# Patient Record
Sex: MALE | Race: BLACK OR AFRICAN AMERICAN | NOT HISPANIC OR LATINO | URBAN - METROPOLITAN AREA
[De-identification: names, ages, dates, MRNs, and addresses within clinical notes are randomized per-mention and may not be internally consistent; named-entity substitution may affect disease eponyms.]

---

## 2018-07-01 ENCOUNTER — OUTPATIENT (OUTPATIENT)
Dept: OUTPATIENT SERVICES | Facility: HOSPITAL | Age: 63
LOS: 1 days | End: 2018-07-01
Payer: MEDICAID

## 2018-07-03 DIAGNOSIS — Z71.89 OTHER SPECIFIED COUNSELING: ICD-10-CM

## 2018-07-31 ENCOUNTER — OUTPATIENT (OUTPATIENT)
Dept: OUTPATIENT SERVICES | Facility: HOSPITAL | Age: 63
LOS: 1 days | Discharge: STILL ADMITTED OR ADMINISTRATIVE | End: 2018-07-31

## 2018-08-01 DIAGNOSIS — E78.5 HYPERLIPIDEMIA, UNSPECIFIED: ICD-10-CM

## 2018-08-01 DIAGNOSIS — K21.9 GASTRO-ESOPHAGEAL REFLUX DISEASE WITHOUT ESOPHAGITIS: ICD-10-CM

## 2018-08-01 DIAGNOSIS — F25.9 SCHIZOAFFECTIVE DISORDER, UNSPECIFIED: ICD-10-CM

## 2018-08-01 DIAGNOSIS — I10 ESSENTIAL (PRIMARY) HYPERTENSION: ICD-10-CM

## 2019-01-04 ENCOUNTER — EMERGENCY (EMERGENCY)
Facility: HOSPITAL | Age: 64
LOS: 1 days | Discharge: HOME | End: 2019-01-04
Attending: EMERGENCY MEDICINE | Admitting: EMERGENCY MEDICINE
Payer: MEDICAID

## 2019-01-04 VITALS
RESPIRATION RATE: 18 BRPM | SYSTOLIC BLOOD PRESSURE: 147 MMHG | OXYGEN SATURATION: 100 % | HEART RATE: 84 BPM | TEMPERATURE: 98 F | DIASTOLIC BLOOD PRESSURE: 97 MMHG

## 2019-01-04 VITALS
OXYGEN SATURATION: 100 % | SYSTOLIC BLOOD PRESSURE: 135 MMHG | HEART RATE: 18 BPM | RESPIRATION RATE: 18 BRPM | DIASTOLIC BLOOD PRESSURE: 81 MMHG | TEMPERATURE: 98 F

## 2019-01-04 LAB
ALBUMIN SERPL ELPH-MCNC: 4.3 G/DL — SIGNIFICANT CHANGE UP (ref 3.3–5)
ALP SERPL-CCNC: 42 U/L — SIGNIFICANT CHANGE UP (ref 40–120)
ALT FLD-CCNC: 14 U/L — SIGNIFICANT CHANGE UP (ref 4–41)
AST SERPL-CCNC: 26 U/L — SIGNIFICANT CHANGE UP (ref 4–40)
BILIRUB SERPL-MCNC: 0.5 MG/DL — SIGNIFICANT CHANGE UP (ref 0.2–1.2)
BUN SERPL-MCNC: 8 MG/DL — SIGNIFICANT CHANGE UP (ref 7–23)
CALCIUM SERPL-MCNC: 9.7 MG/DL — SIGNIFICANT CHANGE UP (ref 8.4–10.5)
CHLORIDE SERPL-SCNC: 100 MMOL/L — SIGNIFICANT CHANGE UP (ref 98–107)
CO2 SERPL-SCNC: 26 MMOL/L — SIGNIFICANT CHANGE UP (ref 22–31)
CREAT SERPL-MCNC: 0.74 MG/DL — SIGNIFICANT CHANGE UP (ref 0.5–1.3)
GLUCOSE SERPL-MCNC: 84 MG/DL — SIGNIFICANT CHANGE UP (ref 70–99)
HCT VFR BLD CALC: 48.9 % — SIGNIFICANT CHANGE UP (ref 39–50)
HGB BLD-MCNC: 15.9 G/DL — SIGNIFICANT CHANGE UP (ref 13–17)
MCHC RBC-ENTMCNC: 28.8 PG — SIGNIFICANT CHANGE UP (ref 27–34)
MCHC RBC-ENTMCNC: 32.5 % — SIGNIFICANT CHANGE UP (ref 32–36)
MCV RBC AUTO: 88.4 FL — SIGNIFICANT CHANGE UP (ref 80–100)
NRBC # FLD: 0 — SIGNIFICANT CHANGE UP
PLATELET # BLD AUTO: 252 K/UL — SIGNIFICANT CHANGE UP (ref 150–400)
PMV BLD: 10.6 FL — SIGNIFICANT CHANGE UP (ref 7–13)
POTASSIUM SERPL-MCNC: 4.5 MMOL/L — SIGNIFICANT CHANGE UP (ref 3.5–5.3)
POTASSIUM SERPL-SCNC: 4.5 MMOL/L — SIGNIFICANT CHANGE UP (ref 3.5–5.3)
PROT SERPL-MCNC: 7.7 G/DL — SIGNIFICANT CHANGE UP (ref 6–8.3)
RBC # BLD: 5.53 M/UL — SIGNIFICANT CHANGE UP (ref 4.2–5.8)
RBC # FLD: 15.1 % — HIGH (ref 10.3–14.5)
SODIUM SERPL-SCNC: 136 MMOL/L — SIGNIFICANT CHANGE UP (ref 135–145)
WBC # BLD: 4.14 K/UL — SIGNIFICANT CHANGE UP (ref 3.8–10.5)
WBC # FLD AUTO: 4.14 K/UL — SIGNIFICANT CHANGE UP (ref 3.8–10.5)

## 2019-01-04 PROCEDURE — 99284 EMERGENCY DEPT VISIT MOD MDM: CPT

## 2019-01-04 PROCEDURE — 71046 X-RAY EXAM CHEST 2 VIEWS: CPT | Mod: 26

## 2019-01-04 PROCEDURE — 72131 CT LUMBAR SPINE W/O DYE: CPT | Mod: 26

## 2019-01-04 RX ORDER — KETOROLAC TROMETHAMINE 30 MG/ML
30 SYRINGE (ML) INJECTION ONCE
Qty: 0 | Refills: 0 | Status: DISCONTINUED | OUTPATIENT
Start: 2019-01-04 | End: 2019-01-04

## 2019-01-04 RX ORDER — ACETAMINOPHEN 500 MG
650 TABLET ORAL ONCE
Qty: 0 | Refills: 0 | Status: COMPLETED | OUTPATIENT
Start: 2019-01-04 | End: 2019-01-04

## 2019-01-04 RX ORDER — LIDOCAINE 4 G/100G
1 CREAM TOPICAL ONCE
Qty: 0 | Refills: 0 | Status: COMPLETED | OUTPATIENT
Start: 2019-01-04 | End: 2019-01-04

## 2019-01-04 RX ADMIN — Medication 650 MILLIGRAM(S): at 11:45

## 2019-01-04 RX ADMIN — LIDOCAINE 1 PATCH: 4 CREAM TOPICAL at 16:03

## 2019-01-04 RX ADMIN — Medication 30 MILLIGRAM(S): at 16:03

## 2019-01-04 RX ADMIN — Medication 650 MILLIGRAM(S): at 16:04

## 2019-01-04 NOTE — ED PROVIDER NOTE - CARE PLAN
Principal Discharge DX:	Sciatica of right side  Assessment and plan of treatment:	Patient advised to follow up with  PRIMARY CARE DOCTOR 1-2 DAYS AND SPINE SPECIALIST WITHIN WEEK and told to return to the emergency department immediately for any new or concerning symptoms such as numbness or tingling, weakness, falls, urinary or bowel changes OR ANY OTHER COMPLAINTS. Patient agrees with plan.    Take motrin or tylenol as needed for pain  -rest, avoid strenuous activity  -Use over the counter lidoderm patch for pain

## 2019-01-04 NOTE — ED PROVIDER NOTE - PMH
Chronic paranoid schizophrenia    GERD (gastroesophageal reflux disease)    GERD (gastroesophageal reflux disease)    HLD (hyperlipidemia)    HTN (hypertension)    Hypertension    Paranoid schizophrenia    Schizoaffective disorder

## 2019-01-04 NOTE — ED PROVIDER NOTE - MEDICAL DECISION MAKING DETAILS
Pt with sciatica pain but with hx of weight loss and smoking concern for more complicated back pain. Will CT spine, CXR, labs, and reassess

## 2019-01-04 NOTE — ED PROVIDER NOTE - NSFOLLOWUPINSTRUCTIONS_ED_ALL_ED_FT
Patient advised to follow up with  PRIMARY CARE DOCTOR 1-2 DAYS AND SPINE SPECIALIST WITHIN WEEK and told to return to the emergency department immediately for any new or concerning symptoms such as numbness or tingling, weakness, falls, urinary or bowel changes OR ANY OTHER COMPLAINTS. Patient agrees with plan.    Take motrin or tylenol as needed for pain  -rest, avoid strenuous activity  -Use over the counter lidoderm patch for pain     Advance activity as tolerated.  Continue all previously prescribed medications as directed unless otherwise instructed.  Follow up with your primary care physician in 48-72 hours- bring copies of your results.  Return to the ER for worsening or persistent symptoms, and/or ANY NEW OR CONCERNING SYMPTOMS. If you have issues obtaining follow up, please call: [phone] to obtain a doctor or specialist who takes your insurance in your area.  You may call [phone] to make an appointment with the internal medicine clinic.

## 2019-01-04 NOTE — ED PROVIDER NOTE - PLAN OF CARE
Patient advised to follow up with  PRIMARY CARE DOCTOR 1-2 DAYS AND SPINE SPECIALIST WITHIN WEEK and told to return to the emergency department immediately for any new or concerning symptoms such as numbness or tingling, weakness, falls, urinary or bowel changes OR ANY OTHER COMPLAINTS. Patient agrees with plan.    Take motrin or tylenol as needed for pain  -rest, avoid strenuous activity  -Use over the counter lidoderm patch for pain

## 2019-01-04 NOTE — ED PROVIDER NOTE - PROGRESS NOTE DETAILS
HEATHER Camacho: Patient seen and worked up by Dr. Bloch, pending CT lumbar spine for c/o low back pain radiating to Rt leg. CT lumbar +disc herniations, no acute or emergent findings. Pt remains neuro intact, ambulatory in ED. Pt stable for d/c home with spine follow up outpatient. Pt advised to take motrin or tylenol as needed for pain and use over the counter lidoderm patches. Pt understood and agreed with plan. All question and concerns addressed. Strict return instructions given.

## 2019-01-04 NOTE — ED ADULT NURSE NOTE - NSIMPLEMENTINTERV_GEN_ALL_ED
Implemented All Universal Safety Interventions:  [place] to call system. Call bell, personal items and telephone within reach. Instruct patient to call for assistance. Room bathroom lighting operational. Non-slip footwear when patient is off stretcher. Physically safe environment: no spills, clutter or unnecessary equipment. Stretcher in lowest position, wheels locked, appropriate side rails in place.

## 2019-01-04 NOTE — ED PROVIDER NOTE - MUSCULOSKELETAL MINIMAL EXAM
Positive straight leg raise. No muscle or point tenderness on leg. Nml strength. Nml sensation. Pulses itnact. No edema. Small callous on left foot head of the 5th metatarsal and hammertoe on left foot

## 2019-01-04 NOTE — ED PROVIDER NOTE - OBJECTIVE STATEMENT
63y M with psychiatric hx on multiple medications presents with 7 month of right back pain radiating down buttock, leg, and heel. Also describes chronic cough and weight loss of 25 pounds. Pt is smoker. No drugs. Has hx of alcohol use none in the last 15 years. Denies CP, SOB, nausea, vomiting, or diarrhea. Had nausea, vomiting, and diarrhea illness 6 months ago now resolved. Denies hx of heart disease, DM, or operation. No injury to back

## 2019-05-10 NOTE — ED PROVIDER NOTE - RESPIRATORY DISTRESS
Discharge instructions given to pt, pt verbalized understanding. All questions answered. Follow-up instructions given.      Chago Sim RN  05/10/19 4288 Rhonchi on left side cleared with coughing

## 2019-12-01 ENCOUNTER — OUTPATIENT (OUTPATIENT)
Dept: OUTPATIENT SERVICES | Facility: HOSPITAL | Age: 64
LOS: 1 days | End: 2019-12-01
Payer: MEDICAID

## 2019-12-01 PROCEDURE — G9005: CPT

## 2020-01-01 ENCOUNTER — OUTPATIENT (OUTPATIENT)
Dept: OUTPATIENT SERVICES | Facility: HOSPITAL | Age: 65
LOS: 1 days | End: 2020-01-01
Payer: MEDICAID

## 2020-01-28 DIAGNOSIS — Z71.89 OTHER SPECIFIED COUNSELING: ICD-10-CM

## 2020-05-27 ENCOUNTER — EMERGENCY (EMERGENCY)
Facility: HOSPITAL | Age: 65
LOS: 1 days | Discharge: LEFT BEFORE DISCHARGE | End: 2020-05-27
Attending: EMERGENCY MEDICINE | Admitting: EMERGENCY MEDICINE
Payer: MEDICAID

## 2020-05-27 VITALS
SYSTOLIC BLOOD PRESSURE: 176 MMHG | OXYGEN SATURATION: 100 % | DIASTOLIC BLOOD PRESSURE: 80 MMHG | RESPIRATION RATE: 16 BRPM | TEMPERATURE: 97 F | HEART RATE: 72 BPM

## 2020-05-27 VITALS
TEMPERATURE: 99 F | DIASTOLIC BLOOD PRESSURE: 92 MMHG | OXYGEN SATURATION: 96 % | RESPIRATION RATE: 16 BRPM | SYSTOLIC BLOOD PRESSURE: 136 MMHG | HEART RATE: 82 BPM

## 2020-05-27 LAB
ALBUMIN SERPL ELPH-MCNC: 4.7 G/DL — SIGNIFICANT CHANGE UP (ref 3.3–5)
ALP SERPL-CCNC: 53 U/L — SIGNIFICANT CHANGE UP (ref 40–120)
ALT FLD-CCNC: 13 U/L — SIGNIFICANT CHANGE UP (ref 4–41)
ANION GAP SERPL CALC-SCNC: 13 MMO/L — SIGNIFICANT CHANGE UP (ref 7–14)
APPEARANCE UR: CLEAR — SIGNIFICANT CHANGE UP
APTT BLD: 33.7 SEC — SIGNIFICANT CHANGE UP (ref 27.5–36.3)
AST SERPL-CCNC: 30 U/L — SIGNIFICANT CHANGE UP (ref 4–40)
BASOPHILS # BLD AUTO: 0.03 K/UL — SIGNIFICANT CHANGE UP (ref 0–0.2)
BASOPHILS NFR BLD AUTO: 0.4 % — SIGNIFICANT CHANGE UP (ref 0–2)
BILIRUB SERPL-MCNC: 0.7 MG/DL — SIGNIFICANT CHANGE UP (ref 0.2–1.2)
BILIRUB UR-MCNC: NEGATIVE — SIGNIFICANT CHANGE UP
BLOOD UR QL VISUAL: NEGATIVE — SIGNIFICANT CHANGE UP
BUN SERPL-MCNC: 8 MG/DL — SIGNIFICANT CHANGE UP (ref 7–23)
CALCIUM SERPL-MCNC: 10.1 MG/DL — SIGNIFICANT CHANGE UP (ref 8.4–10.5)
CHLORIDE SERPL-SCNC: 99 MMOL/L — SIGNIFICANT CHANGE UP (ref 98–107)
CO2 SERPL-SCNC: 26 MMOL/L — SIGNIFICANT CHANGE UP (ref 22–31)
COLOR SPEC: YELLOW — SIGNIFICANT CHANGE UP
CREAT SERPL-MCNC: 0.73 MG/DL — SIGNIFICANT CHANGE UP (ref 0.5–1.3)
EOSINOPHIL # BLD AUTO: 0.03 K/UL — SIGNIFICANT CHANGE UP (ref 0–0.5)
EOSINOPHIL NFR BLD AUTO: 0.4 % — SIGNIFICANT CHANGE UP (ref 0–6)
ETHANOL BLD-MCNC: < 10 MG/DL — SIGNIFICANT CHANGE UP
GLUCOSE SERPL-MCNC: 97 MG/DL — SIGNIFICANT CHANGE UP (ref 70–99)
GLUCOSE UR-MCNC: NEGATIVE — SIGNIFICANT CHANGE UP
HCT VFR BLD CALC: 49.5 % — SIGNIFICANT CHANGE UP (ref 39–50)
HGB BLD-MCNC: 16.1 G/DL — SIGNIFICANT CHANGE UP (ref 13–17)
IMM GRANULOCYTES NFR BLD AUTO: 0.1 % — SIGNIFICANT CHANGE UP (ref 0–1.5)
INR BLD: 0.98 — SIGNIFICANT CHANGE UP (ref 0.88–1.17)
KETONES UR-MCNC: NEGATIVE — SIGNIFICANT CHANGE UP
LEUKOCYTE ESTERASE UR-ACNC: NEGATIVE — SIGNIFICANT CHANGE UP
LIDOCAIN IGE QN: 22.9 U/L — SIGNIFICANT CHANGE UP (ref 7–60)
LYMPHOCYTES # BLD AUTO: 1.38 K/UL — SIGNIFICANT CHANGE UP (ref 1–3.3)
LYMPHOCYTES # BLD AUTO: 20.1 % — SIGNIFICANT CHANGE UP (ref 13–44)
MCHC RBC-ENTMCNC: 28 PG — SIGNIFICANT CHANGE UP (ref 27–34)
MCHC RBC-ENTMCNC: 32.5 % — SIGNIFICANT CHANGE UP (ref 32–36)
MCV RBC AUTO: 86.1 FL — SIGNIFICANT CHANGE UP (ref 80–100)
MONOCYTES # BLD AUTO: 0.61 K/UL — SIGNIFICANT CHANGE UP (ref 0–0.9)
MONOCYTES NFR BLD AUTO: 8.9 % — SIGNIFICANT CHANGE UP (ref 2–14)
NEUTROPHILS # BLD AUTO: 4.79 K/UL — SIGNIFICANT CHANGE UP (ref 1.8–7.4)
NEUTROPHILS NFR BLD AUTO: 70.1 % — SIGNIFICANT CHANGE UP (ref 43–77)
NITRITE UR-MCNC: NEGATIVE — SIGNIFICANT CHANGE UP
NRBC # FLD: 0 K/UL — SIGNIFICANT CHANGE UP (ref 0–0)
PH UR: 6.5 — SIGNIFICANT CHANGE UP (ref 5–8)
PLATELET # BLD AUTO: 224 K/UL — SIGNIFICANT CHANGE UP (ref 150–400)
PMV BLD: 10.1 FL — SIGNIFICANT CHANGE UP (ref 7–13)
POTASSIUM SERPL-MCNC: 4.5 MMOL/L — SIGNIFICANT CHANGE UP (ref 3.5–5.3)
POTASSIUM SERPL-SCNC: 4.5 MMOL/L — SIGNIFICANT CHANGE UP (ref 3.5–5.3)
PROT SERPL-MCNC: 8.1 G/DL — SIGNIFICANT CHANGE UP (ref 6–8.3)
PROT UR-MCNC: NEGATIVE — SIGNIFICANT CHANGE UP
PROTHROM AB SERPL-ACNC: 11.3 SEC — SIGNIFICANT CHANGE UP (ref 9.8–13.1)
RBC # BLD: 5.75 M/UL — SIGNIFICANT CHANGE UP (ref 4.2–5.8)
RBC # FLD: 15 % — HIGH (ref 10.3–14.5)
SODIUM SERPL-SCNC: 138 MMOL/L — SIGNIFICANT CHANGE UP (ref 135–145)
SP GR SPEC: 1.02 — SIGNIFICANT CHANGE UP (ref 1–1.04)
UROBILINOGEN FLD QL: SIGNIFICANT CHANGE UP
WBC # BLD: 6.85 K/UL — SIGNIFICANT CHANGE UP (ref 3.8–10.5)
WBC # FLD AUTO: 6.85 K/UL — SIGNIFICANT CHANGE UP (ref 3.8–10.5)

## 2020-05-27 PROCEDURE — 99285 EMERGENCY DEPT VISIT HI MDM: CPT

## 2020-05-27 PROCEDURE — 73552 X-RAY EXAM OF FEMUR 2/>: CPT | Mod: 26,LT

## 2020-05-27 PROCEDURE — 72125 CT NECK SPINE W/O DYE: CPT | Mod: 26

## 2020-05-27 PROCEDURE — 71045 X-RAY EXAM CHEST 1 VIEW: CPT | Mod: 26

## 2020-05-27 PROCEDURE — 71101 X-RAY EXAM UNILAT RIBS/CHEST: CPT | Mod: 26,LT

## 2020-05-27 PROCEDURE — 99284 EMERGENCY DEPT VISIT MOD MDM: CPT

## 2020-05-27 PROCEDURE — 73502 X-RAY EXAM HIP UNI 2-3 VIEWS: CPT | Mod: 26,LT

## 2020-05-27 PROCEDURE — 70450 CT HEAD/BRAIN W/O DYE: CPT | Mod: 26

## 2020-05-27 PROCEDURE — 74177 CT ABD & PELVIS W/CONTRAST: CPT | Mod: 26

## 2020-05-27 PROCEDURE — 71260 CT THORAX DX C+: CPT | Mod: 26

## 2020-05-27 RX ORDER — ACETAMINOPHEN 500 MG
2 TABLET ORAL
Qty: 42 | Refills: 0
Start: 2020-05-27 | End: 2020-06-02

## 2020-05-27 RX ORDER — IBUPROFEN 200 MG
1 TABLET ORAL
Qty: 15 | Refills: 0
Start: 2020-05-27 | End: 2020-05-31

## 2020-05-27 RX ORDER — ACETAMINOPHEN 500 MG
650 TABLET ORAL ONCE
Refills: 0 | Status: DISCONTINUED | OUTPATIENT
Start: 2020-05-27 | End: 2020-05-27

## 2020-05-27 RX ORDER — ACETAMINOPHEN 500 MG
975 TABLET ORAL ONCE
Refills: 0 | Status: COMPLETED | OUTPATIENT
Start: 2020-05-27 | End: 2020-05-27

## 2020-05-27 RX ORDER — IBUPROFEN 200 MG
400 TABLET ORAL ONCE
Refills: 0 | Status: COMPLETED | OUTPATIENT
Start: 2020-05-27 | End: 2020-05-27

## 2020-05-27 RX ORDER — IBUPROFEN 200 MG
600 TABLET ORAL ONCE
Refills: 0 | Status: DISCONTINUED | OUTPATIENT
Start: 2020-05-27 | End: 2020-05-27

## 2020-05-27 RX ADMIN — Medication 400 MILLIGRAM(S): at 02:48

## 2020-05-27 RX ADMIN — Medication 975 MILLIGRAM(S): at 02:48

## 2020-05-27 NOTE — ED PROVIDER NOTE - CARE PLAN
Principal Discharge DX:	Musculoskeletal pain Principal Discharge DX:	Rib fractures  Assessment and plan of treatment:	Follow up with your primary care provider within 48 hours  Follow up with an orthopedic doctor within one week  Take Motrin 600mg every 8 hours as needed for pain, take with food  Return to the emergency department with any worsening or concerning symptoms, fevers, chills, inability to bear weight or any other concerns.  Secondary Diagnosis:	Musculoskeletal pain Principal Discharge DX:	Rib fractures  Assessment and plan of treatment:	Follow up with your primary care provider within 48 hours  Follow up with an orthopedic doctor within one week  Take Motrin 600mg every 8 hours as needed for pain, take with food  Return to the emergency department with any worsening or concerning symptoms, fevers, chills, inability to bear weight or any other concerns.  Secondary Diagnosis:	Musculoskeletal pain  Secondary Diagnosis:	Fall

## 2020-05-27 NOTE — ED ADULT NURSE NOTE - INTERVENTIONS DEFINITIONS
Physically safe environment: no spills, clutter or unnecessary equipment/Stretcher in lowest position, wheels locked, appropriate side rails in place/[place] to call system/Monitor for mental status changes and reorient to person, place, and time

## 2020-05-27 NOTE — ED PROVIDER NOTE - PLAN OF CARE
Follow up with your primary care provider within 48 hours  Follow up with an orthopedic doctor within one week  Take Motrin 600mg every 8 hours as needed for pain, take with food  Return to the emergency department with any worsening or concerning symptoms, fevers, chills, inability to bear weight or any other concerns.

## 2020-05-27 NOTE — ED PROVIDER NOTE - MUSCULOSKELETAL MINIMAL EXAM
+L posterior axillary line rib tenderness around 8-10th ribs, no crepitus or ecchymosis, no flail chest or parodical movements

## 2020-05-27 NOTE — ED ADULT NURSE REASSESSMENT NOTE - NS ED NURSE REASSESS COMMENT FT1
Patient resting comfortably. 18 G IV placed to L AC, labs drawn and sent. VS as noted. Awaiting further orders, will continue to monitor.

## 2020-05-27 NOTE — ED ADULT TRIAGE NOTE - CHIEF COMPLAINT QUOTE
Patient from [facility] 75 c/o lower back pain, Left side ribs, L ankle s/p mechanical fall today. Patient states he lost his balance and hit his head. Hx. Schizoaffective, HTN.

## 2020-05-27 NOTE — ED PROVIDER NOTE - ATTENDING CONTRIBUTION TO CARE
HPI: 65 yo M with Past Medical History of schizophrenia, HTN, HLD, GERD that lives at [facility] presents to the ED c/o body pain s/p mechanical fall today. Pt states while walking on sidewalk yesterday afternoon he fell backward on uneven pavement, hitting back then striking head, there was no LOC, pt is not on AC. Pt states he was able to stand on his own power, walked to the store then back to his apartment. Pt reports L sided ribs pain, neck soreness and that is why he came in for eval, did not take any meds since fall for pain. Denies CP, SOB, cough, HA, dizziness, numbness or paresthesias.   EXAM: NAD, thin, head atraumatic, eyes EOMI/PERRL, C/T/L spine without palpable stepoffs or tenderness to palpation, chest wall and clavicles are stable, abd soft nontender, skin without signs of trauma, no ecchymosis, left flank at lower ribs lateral with tenderness to palpation without open wounds, no crepitus no stepoffs, pelvis stable, moving all 4 ext spontaneously with normal ROM.   MDM: [facility] multiple medical problems [facility] back of head on ground from standing 24 hours prior to arrival that presents with left flank pain and left neck pain. No midline bony tenderness to palpation on neck or spine but found to have left flank/rib pain without obvious gross deformities or signs trauma. Will need imaging and provide pain meds and reassess.

## 2020-05-27 NOTE — ED PROVIDER NOTE - NSFOLLOWUPCLINICS_GEN_ALL_ED_FT
[facility] Orthopedic Surgery  Orthopedic Surgery  [address], 3rd & 4th [place] [address]  Phone: [phone]  Fax:   Follow Up Time:

## 2020-05-27 NOTE — PROVIDER CONTACT NOTE (OTHER) - BACKGROUND
KIA notified to arrange transportation for pt being discharged. KIA spoke with  of KIA, Radha, to confirm Group Home Huddle Form is currently waived.
As per provider, pt is ready for d/c.
no

## 2020-05-27 NOTE — ED ADULT NURSE REASSESSMENT NOTE - NS ED NURSE REASSESS COMMENT FT1
Patient resting comfortably. Reports relief of pain after medication. Per SW, to take cab home for safe discharge. Patient resting comfortably. Reports relief of pain after medication. SW contacted Enio for safe discharge. Pt. awaiting cab service for . Wheeled to exit by PCA.

## 2020-05-27 NOTE — ED PROVIDER NOTE - OBJECTIVE STATEMENT
63yo M w/ pmhx of schizophrenia, HTN, HLD, GERD presents to the ED c/o body pain s/p mechanical fall today. Pt states while walking on sidewalk yesterday afternoon he fell backward on uneven pavement, hitting back then striking head, there was no LOC, pt is not on AC. Pt states he was able to stand on his own power, walked to the store then back to his apartment. Pt reports L sided ribs pain, neck soreness. Denies CP, SOB, cough, HA, dizziness, numbness or paresthesias.

## 2020-05-27 NOTE — ED PROVIDER NOTE - PATIENT PORTAL LINK FT
You can access the FollowMyHealth Patient Portal offered by [facility] by registering at the following website: [email or web address]. By joining [employer]’s FollowMyHealth portal, you will also be able to view your health information using other applications (apps) compatible with our system. You can access the FollowMyHealth Patient Portal offered by [facility] by registering at the following website: [email or web address]. By joining [employer]’s FollowMyHealth portal, you will also be able to view your health information using other applications (apps) compatible with our system.

## 2020-05-27 NOTE — ED PROVIDER NOTE - NSFOLLOWUPINSTRUCTIONS_ED_ALL_ED_FT
Follow up with your primary care provider within 48 hours  Follow up with an orthopedic doctor within one week  Take Motrin 600mg every 8 hours as needed for pain, take with food  Return to the emergency department with any worsening or concerning symptoms, fevers, chills, inability to bear weight or any other concerns.     Use incentive spirometry 10x per hour for the next 2 weeks. You have an abnormal CT scan and need to have another performed for further evaluation to do so. You should return to the ER to have this performed. Use the spirometer given to you in the ER, this can prevent pneumonia. Continue all previously prescribed medications as directed.  Follow up with your primary care physician in 48-72 hours- bring copies of your results.  Return to the ER for worsening or persistent symptoms, and/or ANY NEW OR CONCERNING SYMPTOMS. If you have issues obtaining follow up, please call: [phone] to obtain a doctor or specialist who takes your insurance in your area.  You may call [phone] to make an appointment with the internal medicine clinic.

## 2020-05-27 NOTE — PROVIDER CONTACT NOTE (OTHER) - ASSESSMENT
As per HEATHER Pillai ([id number]), pt is signing out AMA and is able to return to their residence, [employer] [facility]. KIA spoke with Rosa Maria at [facility] ([address] [place] [address] P: [phone]) and confirmed pt's mode of travel is independent via public transportation. KIA spoke with HEATHER Pillai and agreed to send pt via cab. KIA attempted to arrange cab via [employer] online, however pt does not have transportation benefits. KIA arranged cab via [employer], Booking ID [id number], $8.00 trip, confirmed with  of KIA, Radha BHAT Verbal group home huddle form completed with HEATHER Pillai. As per HEATHER Pillai ([id number]), pt is signing out AMA and is able to return to their residence, [employer] [facility]. KIA spoke with Rosa Maria at [facility] ([address] [place] [address] P: [phone]) and confirmed pt's mode of travel is independent via public transportation. KIA spoke with HEATHER Pillai and agreed to send pt via cab. KIA attempted to arrange cab via [employer] online, however pt does not have transportation benefits. KIA arranged cab via [employer], Booking ID [id number], $8.80 trip, confirmed with  of KIA, Radha BHAT Verbal group home huddle form completed with HEATHER Pillai.

## 2020-05-27 NOTE — CONSULT NOTE ADULT - ASSESSMENT
64y male s/p fall with possible intracranial hemorrhage on CT however there was motion artifact     PLAN:   - one shot mri as patient does not want further CT

## 2020-05-27 NOTE — PROVIDER CONTACT NOTE (OTHER) - ASSESSMENT
KIA contacted [facility] [address] ([phone]) to confirm pt mode of transportation. Rosanna ,  informed SW that pt can take a taxi back. KIA contacted [facility] and spoke to eG ([phone]) to book taxi but due to excessive wait time and ineligibility for taxi services, [employer] was booked with confirmation number [id number]. Verbal huddle completed. KIA contacted [facility] [address] ([phone]) to confirm pt mode of transportation. Rosanna ,  informed KIA that pt can take a taxi back. KIA contacted [facility] and spoke to Ge ([phone]) to book taxi but due to excessive wait time and ineligibility for taxi services, [employer] was booked with confirmation number [id number]. Verbal huddle completed.    D/c cancelled due to pt complaining of pain.

## 2020-05-27 NOTE — ED PROVIDER NOTE - CLINICAL SUMMARY MEDICAL DECISION MAKING FREE TEXT BOX
A:  -MSK pain s/p fall  P:  -XR's, pain control A:  -MSK pain s/p fall, rib fx  P:  -XR's, pain control

## 2020-05-27 NOTE — ED ADULT NURSE REASSESSMENT NOTE - NS ED NURSE REASSESS COMMENT FT1
Patient received from night RN resting comfortably, sleeping when undisturbed.  Patient offers no complaints at this time, denies chest pain, SOB, pain to hip.  VSS, will continue to monitor.

## 2020-05-27 NOTE — CONSULT NOTE ADULT - SUBJECTIVE AND OBJECTIVE BOX
NEUROSURGERY CONSULT  ARLIN BROWN   05-27-20 @ 10:41    HPI: 64y Male schizophrenia, HTN, HLD, GERD presents to the ED c/o body pain s/p mechanical fall today. Pt states while walking on sidewalk yesterday afternoon he fell backward on uneven pavement, hitting back then striking head, there was no LOC, pt is not on AC. Pt states he was able to stand on his own power, walked to the store then back to his apartment. Pt reports L sided ribs pain, neck soreness. Denies CP, SOB, cough, HA, dizziness, numbness or paresthesias.    RADIOLOGY:     < from: CT Head No Cont (05.27.20 @ 08:54) >  IMPRESSION:  Motion limited exam.    Focal area of hyperdensity in the lateral right frontal lobe which may be secondary to motion artifact however the possibility of a small area of hemorrhage is not excluded. Short-term follow-up CT is advised.    CT CERVICAL SPINE:  TECHNIQUE:  Axial images were obtained using multislice helical technique.  Reformatted coronal and sagittal images were performed.      COMPARISON EXAMINATION:  None.    FINDINGS:    VERTEBRAL BODIES:  Normal.  ALIGNMENT:  Normal in height. No fracture. Multilevel marginal osteophyte formation.  INTERVERTEBRAL DISC SPACES: Multilevel disc bulges/ridges and facet arthrosis producing varying degrees of central spinal stenosis and foraminal stenosis  MISCELLANEOUS:  None.    IMPRESSION:  No fracture or traumatic subluxation.    Findings were discussed with physicians assistant Rodriguez on 5/27/2020 9:23 AM with read back.      PHYSICAL EXAM:  Vital Signs Last 24 Hrs  T(C): 36.1 (27 May 2020 08:19), Max: 37.4 (27 May 2020 02:07)  T(F): 97 (27 May 2020 08:19), Max: 99.3 (27 May 2020 02:07)  HR: 72 (27 May 2020 08:19) (66 - 82)  BP: 176/80 (27 May 2020 08:19) (136/92 - 176/80)  BP(mean): --  RR: 16 (27 May 2020 08:19) (16 - 16)  SpO2: 100% (27 May 2020 08:19) (96% - 100%)    AAOx3  EOMI, PERRL  BAPTISTE x4 with good strength   sensation intact    LABS:                        16.1   6.85  )-----------( 224      ( 27 May 2020 07:10 )             49.5     05-27    138  |  99  |  8   ----------------------------<  97  4.5   |  26  |  0.73    Ca    10.1      27 May 2020 07:10    TPro  8.1  /  Alb  4.7  /  TBili  0.7  /  DBili  x   /  AST  30  /  ALT  13  /  AlkPhos  53  05-27    PT/INR - ( 27 May 2020 07:10 )   PT: 11.3 SEC;   INR: 0.98          PTT - ( 27 May 2020 07:10 )  PTT:33.7 SEC

## 2020-05-27 NOTE — ED ADULT NURSE NOTE - CHIEF COMPLAINT QUOTE
Patient from [facility] 75 c/o lower back pain, Left side ribs, L ankle s/p mechanical fall today. Patient states he lost his balance and hit his head. Hx. Schizoaffective, HTN.

## 2020-05-27 NOTE — ED ADULT NURSE NOTE - OBJECTIVE STATEMENT
Patient received to room 18, A&Ox4, ambulatory, c/o fall. Pt. states he was walking to the store today and fell on uneven sidewalk. Denies hitting head/LOC. No trauma/lacerations noted. Full ROM to all extremities. Tender on palpation to L mid back. MD at bedside. Awaiting further orders, will continue to monitor.

## 2020-05-27 NOTE — ED ADULT NURSE REASSESSMENT NOTE - NS ED NURSE REASSESS COMMENT FT1
Patient c/o hip pain when getting to wheelchair for d/c. Per MD Martinez, pt. to have x-ray to assess. Will continue to monitor.

## 2020-05-27 NOTE — ED PROVIDER NOTE - PROGRESS NOTE DETAILS
Patient endorsing pain of the L hip upon discharge, social work arranged taxi for discharge, will obtain x-ray, postpone discharge, low suspicion given pt ambulatory and weight bearing w/ no deformity. Pt hip XR shows possible chip fx, will now obtain trauma survey and labs and provide pain meds and reassess. HEATHER Rodriguez: Pt has refused further interventions for his injury including transfer to [facility] (trauma Center), admission and a repeat CT scan to eval for evolution/potential bleed. Pt is not interested in those interventions and was repeatedly advised of the complications of not having further evaluation and interventions up to and including death. Pt states he is aware of those risks, that he came here for hip pain and that he wants to go home. Spoke to Neurosurgery who states that a one view MRI could be potentially performed. Pt is currently refusing that study which would be done to R/O neurosurgical emergencies such as an expanding ICH. HEATHER Rodriguez: Revived signout on pt. Hip is neg for FX on final and CT read, + Rib fractures, possible ICH. Pt has refused further interventions for his injury including transfer to [facility] (trauma Center), admission and a repeat CT scan to eval for evolution/potential bleed. Pt is not interested in those interventions and was repeatedly advised of the complications of not having further evaluation and interventions up to and including death. Pt states he is aware of those risks, that he came here for hip pain and that he wants to go home. Spoke to Neurosurgery who states that a one view MRI could be potentially performed. Pt is currently refusing that study which would be done to R/O neurosurgical emergencies such as an expanding ICH. Pt states that he wants to go home now, will AMA. Will provide spirometer for rib fractures. The pt is clinically sober, AA&Ox3, free from distracting injury.  Throughout our interactions in the ED today, the pt has demonstrated concrete thinking/reasoning, has maintained an orderly/reasonable conversation, appears to have intact insight/judgment/reason and therefore in our opinion has capacity to make decisions.  Given the pt’s presentation, we communicated our concern for Intracranial hemorrhage or other traumatic injuries in laymans terms. The pt verbalized an understanding of our worries. We’ve told the patient that the ED evaluation is incomplete & many troublesome conditions haven’t been r/o. We have discussed the need for further ED w/u so we can get more information about his abnormal CT scan and his rib fractures.  We have discussed the range of possible dx, potential testing & tx options.  We’ve made  numerous efforts to prevent the pt from leaving AMA.  Our discussions included the potential outcomes of leaving AMA, including worsening of their condition, becoming permanently disabled/in pain/critically ill, or death.  Despite these efforts, we were unable to convince the pt to stay.  The pt is refusing any  further care and is leaving against medical advice. We have attempted to offer tx/rx/guidance for any dangerous conditions which are most likely and/or dangerous.  We have answered all questions and have implored the pt to return ASAP to complete the w/u.  A staff member witnessed the patient consenting to AMA. The pt is clinically sober, AA&Ox3, free from distracting injury.  Throughout our interactions in the ED today, the pt has demonstrated concrete thinking/reasoning, has maintained an orderly/reasonable conversation, appears to have intact insight/judgment/reason and therefore in our opinion has capacity to make decisions.  Given the pt’s presentation, we communicated our concern for Intracranial hemorrhage or other traumatic injuries in laymans terms. The pt verbalized an understanding of our worries. We’ve told the patient that the ED evaluation is incomplete & many troublesome conditions haven’t been r/o. We have discussed the need for further ED w/u so we can get more information about his abnormal CT scan and his rib fractures.  We have discussed the range of possible dx, potential testing & tx options.  We’ve made  numerous efforts to prevent the pt from leaving AMA.  Our discussions included the potential outcomes of leaving AMA, including worsening of their condition, becoming permanently disabled/in pain/critically ill, or death.  Despite these efforts, we were unable to convince the pt to stay.  The pt is refusing any  further care and is leaving against medical advice. We have attempted to offer tx/rx/guidance for any dangerous conditions which are most likely and/or dangerous.  We have answered all questions and have implored the pt to return ASAP to complete the w/u.  A staff member witnessed the patient consenting to AMA. While pt carries an underlying psychiatric diagnosis pt while in the ER has demonstrated understanding of his medical conditions and insight regarding the risks of not having further investigation into his injuries. Pt currently displays capacity to AMA.

## 2020-08-01 ENCOUNTER — EMERGENCY (EMERGENCY)
Facility: HOSPITAL | Age: 65
LOS: 1 days | Discharge: PSYCHIATRIC OR SUBSTANCE USE FACILITY | End: 2020-08-01
Attending: EMERGENCY MEDICINE | Admitting: EMERGENCY MEDICINE
Payer: MEDICAID

## 2020-08-01 VITALS
RESPIRATION RATE: 16 BRPM | DIASTOLIC BLOOD PRESSURE: 85 MMHG | HEART RATE: 59 BPM | SYSTOLIC BLOOD PRESSURE: 135 MMHG | TEMPERATURE: 98 F | OXYGEN SATURATION: 100 %

## 2020-08-01 VITALS
OXYGEN SATURATION: 100 % | DIASTOLIC BLOOD PRESSURE: 84 MMHG | SYSTOLIC BLOOD PRESSURE: 132 MMHG | TEMPERATURE: 99 F | HEART RATE: 87 BPM | RESPIRATION RATE: 18 BRPM

## 2020-08-01 DIAGNOSIS — F20.0 PARANOID SCHIZOPHRENIA: ICD-10-CM

## 2020-08-01 LAB
ANION GAP SERPL CALC-SCNC: 14 MMO/L — SIGNIFICANT CHANGE UP (ref 7–14)
APAP SERPL-MCNC: < 15 UG/ML — LOW (ref 15–25)
BUN SERPL-MCNC: 14 MG/DL — SIGNIFICANT CHANGE UP (ref 7–23)
CALCIUM SERPL-MCNC: 8.8 MG/DL — SIGNIFICANT CHANGE UP (ref 8.4–10.5)
CHLORIDE SERPL-SCNC: 103 MMOL/L — SIGNIFICANT CHANGE UP (ref 98–107)
CO2 SERPL-SCNC: 23 MMOL/L — SIGNIFICANT CHANGE UP (ref 22–31)
CREAT SERPL-MCNC: 0.77 MG/DL — SIGNIFICANT CHANGE UP (ref 0.5–1.3)
ETHANOL BLD-MCNC: < 10 MG/DL — SIGNIFICANT CHANGE UP
GLUCOSE SERPL-MCNC: 120 MG/DL — HIGH (ref 70–99)
HCT VFR BLD CALC: 37.9 % — LOW (ref 39–50)
HGB BLD-MCNC: 12.4 G/DL — LOW (ref 13–17)
MCHC RBC-ENTMCNC: 28 PG — SIGNIFICANT CHANGE UP (ref 27–34)
MCHC RBC-ENTMCNC: 32.7 % — SIGNIFICANT CHANGE UP (ref 32–36)
MCV RBC AUTO: 85.6 FL — SIGNIFICANT CHANGE UP (ref 80–100)
NRBC # FLD: 0 K/UL — SIGNIFICANT CHANGE UP (ref 0–0)
PLATELET # BLD AUTO: 211 K/UL — SIGNIFICANT CHANGE UP (ref 150–400)
PMV BLD: 9.4 FL — SIGNIFICANT CHANGE UP (ref 7–13)
POTASSIUM SERPL-MCNC: 4.1 MMOL/L — SIGNIFICANT CHANGE UP (ref 3.5–5.3)
POTASSIUM SERPL-SCNC: 4.1 MMOL/L — SIGNIFICANT CHANGE UP (ref 3.5–5.3)
RBC # BLD: 4.43 M/UL — SIGNIFICANT CHANGE UP (ref 4.2–5.8)
RBC # FLD: 14.9 % — HIGH (ref 10.3–14.5)
SALICYLATES SERPL-MCNC: < 5 MG/DL — LOW (ref 15–30)
SARS-COV-2 RNA SPEC QL NAA+PROBE: SIGNIFICANT CHANGE UP
SODIUM SERPL-SCNC: 140 MMOL/L — SIGNIFICANT CHANGE UP (ref 135–145)
TSH SERPL-MCNC: 1.25 UIU/ML — SIGNIFICANT CHANGE UP (ref 0.27–4.2)
VALPROATE SERPL-MCNC: < 3.2 UG/ML — LOW (ref 50–100)
WBC # BLD: 3.86 K/UL — SIGNIFICANT CHANGE UP (ref 3.8–10.5)
WBC # FLD AUTO: 3.86 K/UL — SIGNIFICANT CHANGE UP (ref 3.8–10.5)

## 2020-08-01 PROCEDURE — 99285 EMERGENCY DEPT VISIT HI MDM: CPT

## 2020-08-01 RX ORDER — HALOPERIDOL DECANOATE 100 MG/ML
5 INJECTION INTRAMUSCULAR ONCE
Refills: 0 | Status: COMPLETED | OUTPATIENT
Start: 2020-08-01 | End: 2020-08-01

## 2020-08-01 RX ORDER — FLUPHENAZINE HYDROCHLORIDE 1 MG/1
10 TABLET, FILM COATED ORAL ONCE
Refills: 0 | Status: COMPLETED | OUTPATIENT
Start: 2020-08-01 | End: 2020-08-01

## 2020-08-01 RX ORDER — BENZTROPINE MESYLATE 1 MG
1 TABLET ORAL ONCE
Refills: 0 | Status: COMPLETED | OUTPATIENT
Start: 2020-08-01 | End: 2020-08-01

## 2020-08-01 RX ADMIN — FLUPHENAZINE HYDROCHLORIDE 10 MILLIGRAM(S): 1 TABLET, FILM COATED ORAL at 23:00

## 2020-08-01 RX ADMIN — HALOPERIDOL DECANOATE 5 MILLIGRAM(S): 100 INJECTION INTRAMUSCULAR at 15:42

## 2020-08-01 RX ADMIN — Medication 2 MILLIGRAM(S): at 15:42

## 2020-08-01 RX ADMIN — Medication 1 MILLIGRAM(S): at 23:00

## 2020-08-01 NOTE — ED BEHAVIORAL HEALTH ASSESSMENT NOTE - PSYCHIATRIC ISSUES AND PLAN (INCLUDE STANDING AND PRN MEDICATION)
Restart home meds - Abilify 30mg qHS, fluphenazine 10mg BID, Depakote 500mg BID, Cogentin 1mg BID, Senna BID, Colace BID. Consider CIFUENTES. Haldol 5mg, Ativan 2mg, Bendaryl 50mg PO/IM PRN for agitation

## 2020-08-01 NOTE — ED ADULT TRIAGE NOTE - CHIEF COMPLAINT QUOTE
Pt from bus stop and bystander called 911 for pt yelling and screaming, cursing, and jumping around.  Unknown Hx

## 2020-08-01 NOTE — ED BEHAVIORAL HEALTH ASSESSMENT NOTE - CASE SUMMARY
Pt is a 63yo man, single, non-caretaker, disabled per previous documentation, domiciled in residence of [facility]; PPH of SCZ vs SAD, numerous previous hospitalizations, no known previous SAs, SIB, no known violence or legal problems; BIB EMS found disorganized, threatening others at bus station.  Pt at this time is irritable, extremely disorganized, refusing to speak to provider and unable to provide much history.  Per collateral, pt has been med noncompliant, not cooperative at residence, verbally aggressive, pacing in hallway.  As pt is disorganized to point of being unable to care for basic needs, frankly psychotic and med noncompliant, with safety risk of yelling at others on the street, he warrants 2P involuntary psych admission.  As no inpatient male psych beds available at [facility] or [facility] will pursue transfer to [facility] once medical clearance completed.

## 2020-08-01 NOTE — ED BEHAVIORAL HEALTH NOTE - BEHAVIORAL HEALTH NOTE
KIA received handoff from KIA Huizar.  Per provider, Stalin SHERMAN, the patient has been accepted to [facility] [phone]. KIA called [place] and spoke to Arturo, representative, who requested a fax of the patients COVID results fax#[phone]. KIA faxed COVID negative results. Arturo acknowledged receipt. Arturo reported that she will obtain insurance prior authorization for both patients. Patient is assigned to [place] [address] [phone]. KIA informed providers. Nurse Pillai provided handoff to [place] and arranged transportation.  KIA role is complete.

## 2020-08-01 NOTE — ED BEHAVIORAL HEALTH ASSESSMENT NOTE - RISK ASSESSMENT
Low Acute Suicide Risk Static risk factors include male gender, advanced age, hx of psychotic disorder. Modifiable risk factors include med noncompliance, current psychosis. Protective factors include domiciled, social support. Overall low acute suicide risk, however pt requires involuntary psych admission for inability to care for self in setting of acute psychosis and med noncompliance.

## 2020-08-01 NOTE — ED BEHAVIORAL HEALTH ASSESSMENT NOTE - DETAILS
n/a pending bed availability no referrant, provided update to [employer] handoff to provider on Me[facility] 2 provided update to main ED

## 2020-08-01 NOTE — ED PROVIDER NOTE - CLINICAL SUMMARY MEDICAL DECISION MAKING FREE TEXT BOX
David: will check labs as he can not communicate needs well. Will get psych eval and social work to ensure safety

## 2020-08-01 NOTE — ED ADULT NURSE NOTE - OBJECTIVE STATEMENT
Pt BIB EMS and [employer] due to bystander calling 911 on him for bizarre agitated behavior at a bus stop. As per ems report, the pat was standing at a bus stop yelling, cursing, and jumping around. 	Pt is not answering assessment questions. He’s mumbling and cursing writer. Refusing interview. Will continue to monitor.

## 2020-08-01 NOTE — ED BEHAVIORAL HEALTH ASSESSMENT NOTE - CURRENT MEDICATION
Abilify 30mg qHS, fluphenazine 10mg BID, Depakote 500mg BID, Cogentin 1mg BID, Senna BID, Colace BID

## 2020-08-01 NOTE — ED BEHAVIORAL HEALTH ASSESSMENT NOTE - DESCRIPTION
Vital Signs Last 24 Hrs  T(C): 37.1 (01 Aug 2020 12:36), Max: 37.1 (01 Aug 2020 12:36)  T(F): 98.8 (01 Aug 2020 12:36), Max: 98.8 (01 Aug 2020 12:36)  HR: 87 (01 Aug 2020 12:36) (87 - 87)  BP: 132/84 (01 Aug 2020 12:36) (132/84 - 132/84)  BP(mean): --  RR: 18 (01 Aug 2020 12:36) (18 - 18)  SpO2: 100% (01 Aug 2020 12:36) (100% - 100%) HLD, HTN, takes Lipitor 20mg qHS, Norvasc 10mg qAM, multivitamin. disabled per documentation, lives at [facility]

## 2020-08-01 NOTE — ED BEHAVIORAL HEALTH ASSESSMENT NOTE - SUMMARY
Pt is a 65yo man, single, non-caretaker, disabled per previous documentation, domiciled in residence of [facility]; PPH of SCZ vs SAD, numerous previous hospitalizations, no known previous SAs, SIB, no known violence or legal problems; BIB EMS found disorganized, threatening others at bus station. Psych consulted for psychosis.    Pt at this time is irritable, extremely disorganized, refusing to speak to provider and unable to provide much history. Per collateral, pt has been med noncompliant, not cooperative at resistant, verbally aggressive, pacing in hallway. As safety assessment cannot be performed, and pt is disorganized to point of being unable to care for basic needs, frankly psychotic and med noncompliant, with safety risk of yelling at others on the street, warrants [facility] involuntary psych admission. Pt is a 65yo man, single, non-caretaker, disabled per previous documentation, domiciled in residence of [facility]; PPH of SCZ vs SAD, numerous previous hospitalizations, no known previous SAs, SIB, no known violence or legal problems; BIB EMS found disorganized, threatening others at bus station. Psych consulted for psychosis.    Pt at this time is irritable, extremely disorganized, refusing to speak to provider and unable to provide much history. Per collateral, pt has been med noncompliant, not cooperative at residence, verbally aggressive, pacing in hallway. As safety assessment cannot be performed, and pt is disorganized to point of being unable to care for basic needs, frankly psychotic and med noncompliant, with safety risk of yelling at others on the street, warrants C involuntary psych admission.

## 2020-08-01 NOTE — ED BEHAVIORAL HEALTH NOTE - BEHAVIORAL HEALTH NOTE
No male bed available at [facility] or [facility]. Writer contacted [facility] ([phone]) with male beds available. Referral faxed to [facility] at fax #[phone]. No male bed available at [facility] or [facility]. Writer contacted [facility] ([phone]) with male beds available. Referral faxed to [facility] at fax #[phone]. Writer faxed BH assessment, ED provider note, legals and EKG. Labs and COVID results remain pending.

## 2020-08-01 NOTE — ED PROVIDER NOTE - OBJECTIVE STATEMENT
Is This A New Presentation, Or A Follow-Up?: Skin Lesion
What Type Of Note Output Would You Prefer (Optional)?: Bullet Format
How Severe Is Your Skin Lesion?: mild
Has Your Skin Lesion Been Treated?: not been treated
Patient brought in from bus station where he was yelling and being aggressive toward others. no complaints from patient. EMS has transported him before and states he has psych disorder and takes abilify. Patient answers questions but lapses into gibberish. has flight of ideas and is hard to keep focussed. No trauma  Old records indicate he is on valproic acid.

## 2020-08-01 NOTE — ED BEHAVIORAL HEALTH NOTE - BEHAVIORAL HEALTH NOTE
COVID Exposure Screen- Collateral (i.e. third-party, chart review, belongings, etc; include EMS and ED staff)    1.	*In the past 14 days, has the patient been around anyone with a positive COVID-19 test?*   (  ) Yes   (X) No   (  ) Unknown- Reason (e.g. collateral uncertain, refusing to answer, etc.):  ______  IF YES PROCEED TO QUESTION #2. IF NO or UNKNOWN, PLEASE SKIP TO QUESTION #7  2.	Was the patient within 6 feet of them for at least 15 minutes? (  ) Yes   (  ) No   (  ) Unknown- Reason: ______    3.	Did the patient provided care for them? (  ) Yes   (  ) No   (  ) Unknown- Reason: ______    4.	Did the patient have direct physical contact with them (touched, hugged, or kissed them)?   (  ) Yes   (  ) No    (  ) Unknown- Reason: ______    5.	Did the patient share eating or drinking utensils with them? (  ) Yes   (  ) No    (  ) Unknown- Reason: ______    6.	Have they sneezed, coughed, or somehow got respiratory droplets on the patient? (  ) Yes   (  ) No    (  ) Unknown- Reason: ______      7.	*Has the patient been out of [place] within the past 14 days?*  (  ) Yes   (X) No   (  ) Unknown- Reason (e.g. collateral uncertain, refusing to answer, etc.): _______  IF YES PLEASE ANSWER THE FOLLOWING QUESTIONS:  8.	Which state/country has the patient been to? ______   9.	Was the patient there over 24 hours? (  ) Yes   (  ) No    (  ) Unknown- Reason: ______    10.	What date did the patient return to [facility]? ______ Per [employer] care coordination [phone]    COVID Exposure Screen- Collateral (i.e. third-party, chart review, belongings, etc; include EMS and ED staff)    1.	*In the past 14 days, has the patient been around anyone with a positive COVID-19 test?*   (  ) Yes   (X) No   (  ) Unknown- Reason (e.g. collateral uncertain, refusing to answer, etc.):  ______  IF YES PROCEED TO QUESTION #2. IF NO or UNKNOWN, PLEASE SKIP TO QUESTION #7  2.	Was the patient within 6 feet of them for at least 15 minutes? (  ) Yes   (  ) No   (  ) Unknown- Reason: ______    3.	Did the patient provided care for them? (  ) Yes   (  ) No   (  ) Unknown- Reason: ______    4.	Did the patient have direct physical contact with them (touched, hugged, or kissed them)?   (  ) Yes   (  ) No    (  ) Unknown- Reason: ______    5.	Did the patient share eating or drinking utensils with them? (  ) Yes   (  ) No    (  ) Unknown- Reason: ______    6.	Have they sneezed, coughed, or somehow got respiratory droplets on the patient? (  ) Yes   (  ) No    (  ) Unknown- Reason: ______      7.	*Has the patient been out of [place] within the past 14 days?*  (  ) Yes   (X) No   (  ) Unknown- Reason (e.g. collateral uncertain, refusing to answer, etc.): _______  IF YES PLEASE ANSWER THE FOLLOWING QUESTIONS:  8.	Which state/country has the patient been to? ______   9.	Was the patient there over 24 hours? (  ) Yes   (  ) No    (  ) Unknown- Reason: ______    10.	What date did the patient return to [facility]? ______

## 2020-08-01 NOTE — ED PROVIDER NOTE - PROGRESS NOTE DETAILS
MD CHO:  Pt was signed out to me by Dr. Hernandez.  Play to f/u psych recs.  Per Dr. Mascorro of psych, pt to be transferred to outside facility for psych i/p care pending covid result.  Will f/u covid.

## 2020-08-01 NOTE — ED ADULT NURSE NOTE - NSIMPLEMENTINTERV_GEN_ALL_ED
Implemented All Universal Safety Interventions:  [place] to call system. Call bell, personal items and telephone within reach. Instruct patient to call for assistance. Room bathroom lighting operational. Non-slip footwear when patient is off stretcher. Physically safe environment: no spills, clutter or unnecessary equipment. Stretcher in lowest position, wheels locked, appropriate side rails in place.

## 2020-08-01 NOTE — ED BEHAVIORAL HEALTH ASSESSMENT NOTE - HPI (INCLUDE ILLNESS QUALITY, SEVERITY, DURATION, TIMING, CONTEXT, MODIFYING FACTORS, ASSOCIATED SIGNS AND SYMPTOMS)
Pt is a 63yo man, single, non-caretaker, disabled per previous documentation, domiciled in residence of [employer]; PPH of SCZ vs SAD, numerous previous hospitalizations, no known previous SAs, SIB, no known violence or legal problems; BIB EMS found disorganized, threatening others at bus station. Psych consulted for psychosis.    Pt is irritable, extremely disorganized on interview. Pt has food strewn about bed. Speech is largely intelligible, however frequently tells writer to go away and that he doesn't want to talk. Calls provider Connie Whelan, other unintelligible names, says to go home to family, says "you can't be a doctor, go look at yourself in the mirror, you look like an upside down pancake." Pt states he is not Krishna, when asked who he is, states he is "God." Refuses to answer questions regarding SI/HI, VAH.     Collateral from Leona at [employer] [phone], weekend coverage care coordinator, reports that pt has been med noncompliant for 2-3 weeks. When he takes his medications he is "good," however recently has not been cooperating with staff, has been yelling, verbally aggressive towards staff, has also been talking to himself, restless, frequently paces in hallway at night. Current medications are Abilify 30mg qHS, fluphenazine 10mg BID, Depakote 500mg BID, Cogentin 1mg BID, Senna BID, Colace BID, as well as Lipitor 20mg qHS, Norvasc 10mg qAM, multivitamin.    Message left for psychiatrist Dr. Rios [phone]. Care coordinator is Angely [phone]. PCP is Dr. Dorina Hatch.

## 2020-09-15 ENCOUNTER — EMERGENCY (EMERGENCY)
Facility: HOSPITAL | Age: 65
LOS: 1 days | Discharge: HOME | End: 2020-09-15
Attending: EMERGENCY MEDICINE | Admitting: EMERGENCY MEDICINE
Payer: MEDICAID

## 2020-09-15 VITALS
DIASTOLIC BLOOD PRESSURE: 85 MMHG | RESPIRATION RATE: 16 BRPM | HEART RATE: 65 BPM | SYSTOLIC BLOOD PRESSURE: 173 MMHG | OXYGEN SATURATION: 100 %

## 2020-09-15 VITALS
HEART RATE: 84 BPM | SYSTOLIC BLOOD PRESSURE: 132 MMHG | RESPIRATION RATE: 18 BRPM | OXYGEN SATURATION: 100 % | DIASTOLIC BLOOD PRESSURE: 90 MMHG | TEMPERATURE: 98 F

## 2020-09-15 DIAGNOSIS — F48.9 NONPSYCHOTIC MENTAL DISORDER, UNSPECIFIED: ICD-10-CM

## 2020-09-15 DIAGNOSIS — F20.9 SCHIZOPHRENIA, UNSPECIFIED: ICD-10-CM

## 2020-09-15 LAB
ALBUMIN SERPL ELPH-MCNC: 4.2 G/DL — SIGNIFICANT CHANGE UP (ref 3.3–5)
ALP SERPL-CCNC: 61 U/L — SIGNIFICANT CHANGE UP (ref 40–120)
ALT FLD-CCNC: 14 U/L — SIGNIFICANT CHANGE UP (ref 4–41)
ANION GAP SERPL CALC-SCNC: 15 MMO/L — HIGH (ref 7–14)
APAP SERPL-MCNC: < 15 UG/ML — LOW (ref 15–25)
AST SERPL-CCNC: 27 U/L — SIGNIFICANT CHANGE UP (ref 4–40)
BASOPHILS # BLD AUTO: 0.05 K/UL — SIGNIFICANT CHANGE UP (ref 0–0.2)
BASOPHILS NFR BLD AUTO: 0.8 % — SIGNIFICANT CHANGE UP (ref 0–2)
BILIRUB SERPL-MCNC: 0.6 MG/DL — SIGNIFICANT CHANGE UP (ref 0.2–1.2)
BUN SERPL-MCNC: 14 MG/DL — SIGNIFICANT CHANGE UP (ref 7–23)
CALCIUM SERPL-MCNC: 9.2 MG/DL — SIGNIFICANT CHANGE UP (ref 8.4–10.5)
CHLORIDE SERPL-SCNC: 101 MMOL/L — SIGNIFICANT CHANGE UP (ref 98–107)
CO2 SERPL-SCNC: 22 MMOL/L — SIGNIFICANT CHANGE UP (ref 22–31)
CREAT SERPL-MCNC: 0.62 MG/DL — SIGNIFICANT CHANGE UP (ref 0.5–1.3)
EOSINOPHIL # BLD AUTO: 0.03 K/UL — SIGNIFICANT CHANGE UP (ref 0–0.5)
EOSINOPHIL NFR BLD AUTO: 0.5 % — SIGNIFICANT CHANGE UP (ref 0–6)
ETHANOL BLD-MCNC: < 10 MG/DL — SIGNIFICANT CHANGE UP
GLUCOSE SERPL-MCNC: 64 MG/DL — LOW (ref 70–99)
HCT VFR BLD CALC: 42.3 % — SIGNIFICANT CHANGE UP (ref 39–50)
HGB BLD-MCNC: 13.3 G/DL — SIGNIFICANT CHANGE UP (ref 13–17)
IMM GRANULOCYTES NFR BLD AUTO: 0.8 % — SIGNIFICANT CHANGE UP (ref 0–1.5)
LYMPHOCYTES # BLD AUTO: 1.27 K/UL — SIGNIFICANT CHANGE UP (ref 1–3.3)
LYMPHOCYTES # BLD AUTO: 19.8 % — SIGNIFICANT CHANGE UP (ref 13–44)
MCHC RBC-ENTMCNC: 27.7 PG — SIGNIFICANT CHANGE UP (ref 27–34)
MCHC RBC-ENTMCNC: 31.4 % — LOW (ref 32–36)
MCV RBC AUTO: 87.9 FL — SIGNIFICANT CHANGE UP (ref 80–100)
MONOCYTES # BLD AUTO: 0.46 K/UL — SIGNIFICANT CHANGE UP (ref 0–0.9)
MONOCYTES NFR BLD AUTO: 7.2 % — SIGNIFICANT CHANGE UP (ref 2–14)
NEUTROPHILS # BLD AUTO: 4.55 K/UL — SIGNIFICANT CHANGE UP (ref 1.8–7.4)
NEUTROPHILS NFR BLD AUTO: 70.9 % — SIGNIFICANT CHANGE UP (ref 43–77)
NRBC # FLD: 0 K/UL — SIGNIFICANT CHANGE UP (ref 0–0)
PLATELET # BLD AUTO: 211 K/UL — SIGNIFICANT CHANGE UP (ref 150–400)
PMV BLD: 9.5 FL — SIGNIFICANT CHANGE UP (ref 7–13)
POTASSIUM SERPL-MCNC: 4 MMOL/L — SIGNIFICANT CHANGE UP (ref 3.5–5.3)
POTASSIUM SERPL-SCNC: 4 MMOL/L — SIGNIFICANT CHANGE UP (ref 3.5–5.3)
PROT SERPL-MCNC: 6.9 G/DL — SIGNIFICANT CHANGE UP (ref 6–8.3)
RBC # BLD: 4.81 M/UL — SIGNIFICANT CHANGE UP (ref 4.2–5.8)
RBC # FLD: 16 % — HIGH (ref 10.3–14.5)
SALICYLATES SERPL-MCNC: < 5 MG/DL — LOW (ref 15–30)
SARS-COV-2 RNA SPEC QL NAA+PROBE: SIGNIFICANT CHANGE UP
SODIUM SERPL-SCNC: 138 MMOL/L — SIGNIFICANT CHANGE UP (ref 135–145)
TSH SERPL-MCNC: 1.13 UIU/ML — SIGNIFICANT CHANGE UP (ref 0.27–4.2)
WBC # BLD: 6.41 K/UL — SIGNIFICANT CHANGE UP (ref 3.8–10.5)
WBC # FLD AUTO: 6.41 K/UL — SIGNIFICANT CHANGE UP (ref 3.8–10.5)

## 2020-09-15 PROCEDURE — 93010 ELECTROCARDIOGRAM REPORT: CPT

## 2020-09-15 PROCEDURE — 99284 EMERGENCY DEPT VISIT MOD MDM: CPT | Mod: 25

## 2020-09-15 PROCEDURE — 99285 EMERGENCY DEPT VISIT HI MDM: CPT

## 2020-09-15 RX ORDER — HALOPERIDOL DECANOATE 100 MG/ML
5 INJECTION INTRAMUSCULAR ONCE
Refills: 0 | Status: COMPLETED | OUTPATIENT
Start: 2020-09-15 | End: 2020-09-15

## 2020-09-15 RX ADMIN — Medication 2 MILLIGRAM(S): at 10:05

## 2020-09-15 RX ADMIN — HALOPERIDOL DECANOATE 5 MILLIGRAM(S): 100 INJECTION INTRAMUSCULAR at 10:05

## 2020-09-15 NOTE — ED BEHAVIORAL HEALTH ASSESSMENT NOTE - OTHER PAST PSYCHIATRIC HISTORY (INCLUDE DETAILS REGARDING ONSET, COURSE OF ILLNESS, INPATIENT/OUTPATIENT TREATMENT)
PPH of SCZ, numerous previous hospitalizations- last seen in [facility] ED 8/1/20 and hospitalized at [facility] 8/1-8/18 2020, no known previous SAs. Outpatient treatment with Dr. Rios. PPH of SCZ, numerous previous hospitalizations- last seen in [facility] ED 8/1/20 and hospitalized at [facility] 8/1-8/11 2020, no known previous SAs. Outpatient treatment with Dr. Rios.

## 2020-09-15 NOTE — ED BEHAVIORAL HEALTH NOTE - BEHAVIORAL HEALTH NOTE
Per psychiatry, pt will require inpatient admission.  Under direction of Khadijah Araiza, ISABELA ADAM called [facility] ([phone]) at [place] [facility] as pt was recently there in August 2020.  Referral faxed to [facility] at [phone].    KIA called [place] and confirmed receipt of referral however it has not yet been reviewed.  SW to follow.

## 2020-09-15 NOTE — ED BEHAVIORAL HEALTH ASSESSMENT NOTE - DESCRIPTION
Patient psychotic, disorganized and threatening. He required IM medication Haldol 5mg & Ativan 2mg at 10:05AM.     Vital Signs Last 24 Hrs  T(C): 36.6 (15 Sep 2020 08:27), Max: 36.6 (15 Sep 2020 08:27)  T(F): 97.9 (15 Sep 2020 08:27), Max: 97.9 (15 Sep 2020 08:27)  HR: 76 (15 Sep 2020 10:52) (76 - 84)  BP: 142/90 (15 Sep 2020 10:52) (132/90 - 142/90)  BP(mean): --  RR: 16 (15 Sep 2020 10:52) (16 - 18)  SpO2: 100% (15 Sep 2020 10:52) (100% - 100%) HDL, HTN, Acid Reflux disabled per documentation, lives at [facility]

## 2020-09-15 NOTE — ED BEHAVIORAL HEALTH NOTE - BEHAVIORAL HEALTH NOTE
Writer called [employer] [phone] [facility] Maria Farrar  who provided the following information.    She states she had called 911 on him this morning not knowing he wasn't in the residence.  She states they found feces smeared in the elevator this morning and were able to identify him on security cameras.  She states when they called 911 they didn't realize he had changed his clothes and left so they were unable to EDP him.  Pt has not taken medication since August 12th.  She states when pt is medicated he talks to invisible people, yells at invisible people and having arguments with people who aren't there.  He is supposed to be getting treatment at [address] [facility] [place].  She states this smearing feces and  urinating in public is not typical behavior for him and would benefit from inpatient psychiatric admission to get him stable.

## 2020-09-15 NOTE — ED BEHAVIORAL HEALTH ASSESSMENT NOTE - RISK ASSESSMENT
Static risk factors include male gender, advanced age, hx of psychotic disorder.    Modifiable risk factors include med noncompliance, current psychosis, agitation, impulsive and unpredictable behavior with poor judgement and insight.     Protective factors include domiciled, social support. Overall low acute suicide risk, however pt requires involuntary psych admission for inability to care for self in setting of acute psychosis and med noncompliance. Low Acute Suicide Risk Static risk factors include male gender, advanced age, hx of psychotic disorder.    Modifiable risk factors include med noncompliance, current psychosis, agitation, impulsive and unpredictable behavior with poor judgement and insight.     Protective factors include domiciled, social support.

## 2020-09-15 NOTE — ED BEHAVIORAL HEALTH ASSESSMENT NOTE - HPI (INCLUDE ILLNESS QUALITY, SEVERITY, DURATION, TIMING, CONTEXT, MODIFYING FACTORS, ASSOCIATED SIGNS AND SYMPTOMS)
Pt is a 65yo man, single, non-caretaker, disabled per previous documentation, domiciled in residence of [facility]; PPH of SCZ, numerous previous hospitalizations- last seen in [facility] ED 20 and hospitalized at [facility] -2020, no known previous SAs, SIB, no known violence or legal problems; BIB EMS found disorganized, threatening others at bus station. Psych consulted for psychosis and agitation in community. Patient was urinating outside at a local [employer].     Pt is irritable, extremely disorganized on interview. Speech is largely intelligible, however frequently tells writer to "get the fuck away," and that he doesn't want to talk. Calls himself Abhinav Banuelos but later admits his name is ARLIN BROWN REEMA 57. He then accused evaluator of spreading cocaine on the floor and of spreading "shit," in EM doctor's hair. He stated "get that bitch away." Patient was loud and intrusive. He was threatening and required IM medication Haldol 5mg & Ativan 2mg at 10:05AM.     Patient refused to participate further in interview due to severity of agitation and psychosis.     See  note for collateral information.    Spoke with Dr. Rios ([phone]) -Patient was hospitalized at - 20. When she saw patient he was just discharged on oral medications which he abruptly stopped. He has not been compliant since 20. Patient was discharged on Prolixin 20mg QD, Abilify 30mg QHS, Norvasc 5mg Daily, Lipitor 20mg QHS, Prilosec 20mg daily. He lives at [facility] ([phone] ext [phone]) and staff state he is not compliant with medication. His  Angely ([phone])   left a message this AM stating he was agitated - he soiled himself and smeared his feces all over the residence. Pt is a 63yo man, single, non-caretaker, disabled per previous documentation, domiciled in residence of [facility]; PPH of SCZ, numerous previous hospitalizations- last seen in [facility] ED 20 and hospitalized at [facility] -2020, no known previous SAs, SIB, no known violence or legal problems; PMH- HLD, HTN, Acid reflux. BIB EMS found disorganized, threatening others at bus station. Psych consulted for psychosis and agitation in community. Patient was urinating outside at a local [employer].     Pt is irritable, extremely disorganized on interview. Speech is largely intelligible, however frequently tells writer to "get the fuck away," and that he doesn't want to talk. Calls himself Abhinav Banuelos but later admits his name is ARLIN BROWN REEMA 57. He then accused evaluator of spreading cocaine on the floor and of spreading "shit," in EM doctor's hair. He stated "get that bitch away." Patient was loud and intrusive. He was threatening and required IM medication Haldol 5mg & Ativan 2mg at 10:05AM.     Patient refused to participate further in interview due to severity of agitation and psychosis.     See  note for collateral information.    Spoke with Dr. Rios ([phone]) -Patient was hospitalized at - 20. When she saw patient he was just discharged on oral medications which he abruptly stopped. He has not been compliant since discharge. She saw him 2020. Patient was discharged on Prolixin 20mg QD, Abilify 30mg QHS, Norvasc 5mg Daily, Lipitor 20mg QHS, Prilosec 20mg daily. He lives at [facility] ([phone] ext [phone]) and staff state he is not compliant with medication. His  Angely ([phone])   left a message this AM stating he was agitated - he soiled himself and smeared his feces all over the residence. Pt is a 63yo man, single, non-caretaker, disabled per previous documentation, domiciled in residence of [facility]; PPH of SCZ, numerous previous hospitalizations- last seen in [facility] ED 20 and hospitalized at [facility] -2020, no known previous SAs, SIB, no known violence or legal problems; PMH- HLD, HTN, Acid reflux. BIB EMS found disorganized. Psych consulted for psychosis and agitation in community. Patient was urinating outside at a local [employer].     Pt is irritable, extremely disorganized on interview. Speech is largely intelligible, however frequently tells writer to "get the fuck away," and that he doesn't want to talk. Calls himself Abhinav Banuelos but later admits his name is ARLIN BROWN REEMA 57. He then accused evaluator of spreading cocaine on the floor and of spreading "shit," in EM doctor's hair. He stated "get that bitch away." Patient was loud and intrusive. He was threatening and required IM medication Haldol 5mg & Ativan 2mg at 10:05AM.     Patient refused to participate further in interview due to severity of agitation and psychosis.     See  note for collateral information.    Spoke with Dr. Rios ([phone]) -Patient was hospitalized at - 20. When she saw patient he was just discharged on oral medications which he abruptly stopped. He has not been compliant since discharge. She saw him 2020. Patient was discharged on Prolixin 20mg QD, Abilify 30mg QHS, Norvasc 5mg Daily, Lipitor 20mg QHS, Prilosec 20mg daily. He lives at [facility] ([phone] ext [phone]) and staff state he is not compliant with medication. His  Angely ([phone]) - left a message this AM stating he was agitated - he soiled himself and smeared his feces all over the residence.

## 2020-09-15 NOTE — ED ADULT NURSE NOTE - OBJECTIVE STATEMENT
Pt brought in EMS, found at a [employer] urinating in public, throwing air kicks and punches. On assessment, pt is disorganized, agitated, uncooperative, refusing to answer any questions or provide any information. Respirations even/unlabored, nad noted. belongings collected and secured, psych to eval. will continue to monitor.

## 2020-09-15 NOTE — ED BEHAVIORAL HEALTH ASSESSMENT NOTE - CURRENT MEDICATION
Prolixin 20mg QD, Abilify 30mg QHS, cogentin 1mg BID, Norvasc 5mg Daily, Lipitor 20mg QHS, Prilosec 20mg daily.

## 2020-09-15 NOTE — ED BEHAVIORAL HEALTH ASSESSMENT NOTE - THOUGHT PROCESS
Flight of ideas/Disorganized/Impaired reasoning Flight of ideas/Illogical/Disorganized/Impaired reasoning

## 2020-09-15 NOTE — ED BEHAVIORAL HEALTH ASSESSMENT NOTE - SUMMARY
Pt is a 63yo man, single, non-caretaker, disabled per previous documentation, domiciled in residence of [facility]; [facility] of [facility], numerous previous hospitalizations- last seen in [facility] ED 8/1/20 and hospitalized at [facility] 8/1-8/11 2020, no known previous SAs, SIB, no known violence or legal problems; PMH- HLD, HTN, Acid reflux. BIB EMS found disorganized, [place] bus station. Psych consulted for psychosis and agitation in community. Patient was urinating outside at a local [employer].     Pt at this time is irritable, extremely disorganized, threatening and refusing to speak to provider and unable to provide much history. Per collateral, pt has been med noncompliant, not cooperative at residence and inappropriate in the community (urinating in public). He is impulsive and unpredictable with poor judgement and insight.  Patient is unable to care for self and presents at risk to others based on current psychotic behavior. He presents at imminent risk and requires inpatient admission for safety and stabilization. Pt is a 65yo man, single, non-caretaker, disabled per previous documentation, domiciled in residence of [facility]; [facility], numerous previous hospitalizations- last seen in [facility] ED 8/1/20 and hospitalized at [facility] 8/1-8/11 2020, no known previous SAs, SIB, no known violence or legal problems; PMH- HLD, HTN, Acid reflux. BIB EMS found disorganized. Psych consulted for psychosis and agitation in community. Patient was urinating outside at a local [employer].     Pt at this time is irritable, extremely disorganized, threatening and refusing to speak to provider and unable to provide much history. Per collateral, pt has been medication noncompliant, not cooperative at residence and inappropriate in the community (urinating in public/spreading feces at residence). He is impulsive and unpredictable with poor judgement and insight.  Patient is unable to care for self and presents at risk to others based on current psychotic behavior. He presents at imminent risk and requires inpatient admission for safety and stabilization.

## 2020-09-15 NOTE — ED ADULT TRIAGE NOTE - CHIEF COMPLAINT QUOTE
pt arrives from streets.  pt was urinating in public in front of a child.  pt is talking to himself.  not answering questions that are asked of him,. unknown PMH:  pt appears homeless

## 2020-09-15 NOTE — ED BEHAVIORAL HEALTH ASSESSMENT NOTE - DETAILS
[facility]- admitted 8/1/20 see hpi Called [facility]- unable to provide which unit patient was on. Could not reach inpatient provider [facility]- admitted 8/1/20-8/11/2020 [facility] per transfer protocol

## 2020-09-15 NOTE — ED BEHAVIORAL HEALTH ASSESSMENT NOTE - SUICIDE RISK FACTORS
Recent onset of current/past psychiatric diagnosis/Agitation/Severe Anxiety/Panic/Unable to engage in safety planning/Psychotic disorder current/past Psychotic disorder current/past/Unable to engage in safety planning/Impulsivity/Recent onset of current/past psychiatric diagnosis/Agitation/Severe Anxiety/Panic

## 2020-09-15 NOTE — ED BEHAVIORAL HEALTH ASSESSMENT NOTE - VIOLENCE RISK FACTORS:
Impulsivity/Lack of insight into violence risk/need for treatment Lack of insight into violence risk/need for treatment/Noncompliance with treatment/Violent ideation/threat/speech/Affective dysregulation/Impulsivity/Irritability

## 2020-09-15 NOTE — ED BEHAVIORAL HEALTH NOTE - BEHAVIORAL HEALTH NOTE
Per Avril from [facility] ([phone]) pt medically cleared for admission.  COVID and legals faxed to [facility] at [phone]. KIA spoke with Skyla from [facility] ([phone]) and opened a case, ref#[id number].  KIA spoke with Swathi from [facility] and provided verbal clinical.  Authorization is as follows ref#[id number], 3 days 9/15-9/17 next review 9/18 with Leo LARA [phone].  This information was provided to Kailey from [facility] via phone.  Kailey confirmed bed availability and pt can be sent any time, unit [address].  Number for handoff [phone].  Medical team informed. Per Avril from [facility] ([phone]) pt medically cleared for admission.  COVID and legals faxed to [facility] at [phone]. KIA spoke with Skyla from [facility] ([phone]) and opened a case, ref#[id number].  KIA spoke with Swathi from [facility] and provided verbal clinical.  Authorization is as follows ref#[id number], 3 days 9/15-9/17 next review 9/18 with Leo LARA [phone].  This information was provided to Kailey from [facility] via phone.  Kailey confirmed bed availability and pt can be sent any time, unit 80W.  Number for handoff [phone].  Message left for [employer] ([phone]) requesting a call back so undersign can inform them of DC.  Medical team informed of the above. Per Avril from [facility] ([phone]) pt medically cleared for admission under care of Dr. Koch.  COVID and legals faxed to [facility] at [phone]. KIA spoke with Skyla from [facility] ([phone]) and opened a case, ref#[id number].  KIA spoke with Swathi from [facility] and provided verbal clinical.  Authorization is as follows ref#[id number], 3 days 9/15-9/17 next review 9/18 with Leo LARA [phone].  This information was provided to Kailey from [facility] via phone.  [facility] confirmed bed availability and pt can be sent any time, unit 80W.  Number for handoff [phone] which was provided to covering RN.  Message left for [employer] ([phone]) requesting a call back so undersign can inform them of DC.  Medical team informed of the above. Per Avril from [facility] ([phone]) pt medically cleared for admission under care of Dr. Koch.  COVID and legals faxed to [facility] at [phone]. KIA spoke with Skyla from [facility] ([phone]) and opened a case, ref#[id number].  KIA spoke with Swathi from [facility] and provided verbal clinical.  Authorization is as follows ref#[id number], 3 days 9/15-9/17 next review 9/18 with Leo LARA [phone].  This information was provided to Kailey from [facility] via phone.  [facility] confirmed bed availability and pt can be sent any time, unit 80W.  Number for handoff [phone] which was provided to covering RN.  2 messages left for [employer] ([phone]) requesting a call back so undersign can inform them of DC.  Medical team informed of the above.

## 2020-09-15 NOTE — ED PROVIDER NOTE - CARE PLAN
Principal Discharge DX:	Schizophrenia, unspecified type  Secondary Diagnosis:	Deferred condition on axis II

## 2020-09-15 NOTE — ED BEHAVIORAL HEALTH NOTE - BEHAVIORAL HEALTH NOTE
COVID Exposure Screen- Patient    1.	*In the past 14 days, have you been around anyone with a positive COVID-19 test?*   (  ) Yes   ( x ) No   (  ) Unknown- Reason (e.g. patient uncertain, sedated, refusing to answer, etc.):  ______  IF YES PROCEED TO QUESTION #2. IF NO or UNKNOWN, PLEASE SKIP TO QUESTION #7  2.	Were you within 6 feet of them for at least 15 minutes? (  ) Yes   (  ) No   (  ) Unknown- Reason: ______    3.	Have you provided care for them? (  ) Yes   (  ) No   (  ) Unknown- Reason: ______    4.	Have you had direct physical contact with them (touched, hugged, or kissed them)? (  ) Yes   (  ) No    (  ) Unknown- Reason: ______    5.	Have you shared eating or drinking utensils with them? (  ) Yes   (  ) No    (  ) Unknown- Reason: ______    6.	Have they sneezed, coughed, or somehow got respiratory droplets on you? (  ) Yes   (  ) No    (  ) Unknown- Reason: ______    7.	*Have you been out of [place] within the past 14 days?*  (  ) Yes   ( x ) No   (  ) Unknown- Reason (e.g. patient uncertain, sedated, refusing to answer, etc.): _______  IF YES PLEASE ANSWER THE FOLLOWING QUESTIONS:  8.	Which state/country have you been to? ______   9.	Were you there over 24 hours? (  ) Yes   (  ) No    (  ) Unknown- Reason: ______    10.	What date did you return to [facility]? ______ COVID Exposure Screen- Patient    1.	*In the past 14 days, have you been around anyone with a positive COVID-19 test?*   (  ) Yes   (  ) No   (  x) Unknown- Reason (e.g. patient uncertain, sedated, refusing to answer, etc.):  psychotic, agitated   IF YES PROCEED TO QUESTION #2. IF NO or UNKNOWN, PLEASE SKIP TO QUESTION #7  2.	Were you within 6 feet of them for at least 15 minutes? (  ) Yes   (  ) No   (  ) Unknown- Reason: ______    3.	Have you provided care for them? (  ) Yes   (  ) No   (  ) Unknown- Reason: ______    4.	Have you had direct physical contact with them (touched, hugged, or kissed them)? (  ) Yes   (  ) No    (  ) Unknown- Reason: ______    5.	Have you shared eating or drinking utensils with them? (  ) Yes   (  ) No    (  ) Unknown- Reason: ______    6.	Have they sneezed, coughed, or somehow got respiratory droplets on you? (  ) Yes   (  ) No    (  ) Unknown- Reason: ______    7.	*Have you been out of [place] within the past 14 days?*  (  ) Yes   (  ) No   ( x ) Unknown- Reason (e.g. patient uncertain, sedated, refusing to answer, etc.): psychotic, agitated   IF YES PLEASE ANSWER THE FOLLOWING QUESTIONS:  8.	Which state/country have you been to? ______   9.	Were you there over 24 hours? (  ) Yes   (  ) No    (  ) Unknown- Reason: ______    10.	What date did you return to [facility]? ______

## 2020-09-15 NOTE — ED BEHAVIORAL HEALTH ASSESSMENT NOTE - PSYCHIATRIC ISSUES AND PLAN (INCLUDE STANDING AND PRN MEDICATION)
Restart Prolixin 10mg BID, Cogentin 1mg BID, Haldol 5mg PO/IM PRN, ativan 2mg Po/IM PRN, Benadryl 50mg PO/IM PRN Restart Prolixin 10mg QHS, Cogentin 1mg QHS, Haldol 5mg PO/IM PRN, ativan 2mg Po/IM PRN, Benadryl 50mg PO/IM PRN

## 2020-09-15 NOTE — ED PROVIDER NOTE - ATTENDING CONTRIBUTION TO CARE
Dr. Clark: I cared for this patient on my own with the exception of the disposition that was placed in the chart by ISABELA Flores.  See above chart for details.
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
## 2020-09-15 NOTE — ED PROVIDER NOTE - NS ED ATTENDING STATEMENT MOD
Attending Only No I have personally performed a face to face diagnostic evaluation on this patient. I have reviewed the ACP note and agree with the history, exam and plan of care, except as noted.

## 2020-09-19 LAB
SARS-COV-2 IGG SERPL QL IA: POSITIVE
SARS-COV-2 IGM SERPL IA-ACNC: 5.85 INDEX — HIGH

## 2020-09-21 NOTE — ED POST DISCHARGE NOTE - REASON FOR FOLLOW-UP
Other COVID-19 AB for prior infection detected. COVID-19 test negative. Patient contact # [phone] patient is in [place] s/w Margarita in medical records left her my name # and hrs for return call.

## 2021-02-01 PROCEDURE — G9005: CPT

## 2021-08-12 NOTE — ED ADULT NURSE NOTE - NSFALLRSKASSESSTYPE_ED_ALL_ED
General Sunscreen Counseling: I recommended a broad spectrum sunscreen with a SPF of 30 or higher.  I explained that SPF 30 sunscreens block approximately 97 percent of the sun's harmful rays.  Sunscreens should be applied at least 15 minutes prior to expected sun exposure and then every 2 hours after that as long as sun exposure continues. If swimming or exercising sunscreen should be reapplied every 45 minutes to an hour after getting wet or sweating.  One ounce, or the equivalent of a shot glass full of sunscreen, is adequate to protect the skin not covered by a bathing suit. I also recommended a lip balm with a sunscreen as well. Sun protective clothing can be used in lieu of sunscreen but must be worn the entire time you are exposed to the sun's rays.\\n\\n\\n\\n\\nTreatment goal: resolution of actinic damage. Pt is not at treatment goal. Detail Level: Detailed Initial (On Arrival)

## 2023-04-18 NOTE — ED PROVIDER NOTE - NEURO NEGATIVE STATEMENT, MLM
Detail Level: Detailed no loss of consciousness, no gait abnormality, no headache, no sensory deficits, and no weakness.